# Patient Record
Sex: FEMALE | Race: OTHER
[De-identification: names, ages, dates, MRNs, and addresses within clinical notes are randomized per-mention and may not be internally consistent; named-entity substitution may affect disease eponyms.]

---

## 2017-01-03 PROBLEM — Z00.00 ENCOUNTER FOR PREVENTIVE HEALTH EXAMINATION: Status: ACTIVE | Noted: 2017-01-03

## 2017-01-19 ENCOUNTER — APPOINTMENT (OUTPATIENT)
Dept: SURGICAL ONCOLOGY | Facility: CLINIC | Age: 42
End: 2017-01-19

## 2017-01-19 VITALS
BODY MASS INDEX: 21.53 KG/M2 | HEART RATE: 102 BPM | WEIGHT: 120 LBS | DIASTOLIC BLOOD PRESSURE: 78 MMHG | SYSTOLIC BLOOD PRESSURE: 123 MMHG | HEIGHT: 62.5 IN | RESPIRATION RATE: 15 BRPM

## 2017-01-19 DIAGNOSIS — R92.2 INCONCLUSIVE MAMMOGRAM: ICD-10-CM

## 2017-01-19 DIAGNOSIS — N64.59 OTHER SIGNS AND SYMPTOMS IN BREAST: ICD-10-CM

## 2017-01-19 DIAGNOSIS — N64.4 MASTODYNIA: ICD-10-CM

## 2017-01-25 ENCOUNTER — TRANSCRIPTION ENCOUNTER (OUTPATIENT)
Age: 42
End: 2017-01-25

## 2017-01-25 PROBLEM — N64.59 ABNORMAL BREAST EXAM: Status: ACTIVE | Noted: 2017-01-25

## 2017-01-27 ENCOUNTER — TRANSCRIPTION ENCOUNTER (OUTPATIENT)
Age: 42
End: 2017-01-27

## 2017-04-19 ENCOUNTER — RESULT REVIEW (OUTPATIENT)
Age: 42
End: 2017-04-19

## 2017-04-20 ENCOUNTER — LABORATORY RESULT (OUTPATIENT)
Age: 42
End: 2017-04-20

## 2017-04-20 ENCOUNTER — APPOINTMENT (OUTPATIENT)
Dept: DERMATOLOGY | Facility: CLINIC | Age: 42
End: 2017-04-20

## 2017-04-20 VITALS — SYSTOLIC BLOOD PRESSURE: 90 MMHG | DIASTOLIC BLOOD PRESSURE: 60 MMHG

## 2017-04-20 DIAGNOSIS — D48.5 NEOPLASM OF UNCERTAIN BEHAVIOR OF SKIN: ICD-10-CM

## 2017-04-20 DIAGNOSIS — Z12.83 ENCOUNTER FOR SCREENING FOR MALIGNANT NEOPLASM OF SKIN: ICD-10-CM

## 2017-04-20 DIAGNOSIS — L81.4 OTHER MELANIN HYPERPIGMENTATION: ICD-10-CM

## 2017-08-17 ENCOUNTER — EMERGENCY (EMERGENCY)
Facility: HOSPITAL | Age: 42
LOS: 1 days | Discharge: HOME | End: 2017-08-17
Attending: EMERGENCY MEDICINE | Admitting: EMERGENCY MEDICINE
Payer: COMMERCIAL

## 2017-08-17 VITALS
HEART RATE: 80 BPM | RESPIRATION RATE: 14 BRPM | SYSTOLIC BLOOD PRESSURE: 110 MMHG | DIASTOLIC BLOOD PRESSURE: 70 MMHG | OXYGEN SATURATION: 99 %

## 2017-08-17 VITALS
WEIGHT: 121.03 LBS | HEIGHT: 62.5 IN | DIASTOLIC BLOOD PRESSURE: 74 MMHG | OXYGEN SATURATION: 99 % | HEART RATE: 92 BPM | SYSTOLIC BLOOD PRESSURE: 105 MMHG | RESPIRATION RATE: 15 BRPM | TEMPERATURE: 99 F

## 2017-08-17 PROCEDURE — 70450 CT HEAD/BRAIN W/O DYE: CPT | Mod: 26

## 2017-08-17 PROCEDURE — 99284 EMERGENCY DEPT VISIT MOD MDM: CPT | Mod: 25

## 2017-08-17 PROCEDURE — 70450 CT HEAD/BRAIN W/O DYE: CPT

## 2017-08-17 PROCEDURE — 99284 EMERGENCY DEPT VISIT MOD MDM: CPT

## 2017-08-17 RX ORDER — IBUPROFEN 200 MG
2 TABLET ORAL
Qty: 0 | Refills: 0 | COMMUNITY

## 2017-08-17 RX ORDER — ONDANSETRON 8 MG/1
4 TABLET, FILM COATED ORAL ONCE
Qty: 0 | Refills: 0 | Status: COMPLETED | OUTPATIENT
Start: 2017-08-17 | End: 2017-08-17

## 2017-08-17 RX ORDER — IBUPROFEN 200 MG
400 TABLET ORAL ONCE
Qty: 0 | Refills: 0 | Status: COMPLETED | OUTPATIENT
Start: 2017-08-17 | End: 2017-08-17

## 2017-08-17 RX ADMIN — Medication 400 MILLIGRAM(S): at 21:48

## 2017-08-17 RX ADMIN — Medication 400 MILLIGRAM(S): at 21:47

## 2017-08-17 RX ADMIN — ONDANSETRON 4 MILLIGRAM(S): 8 TABLET, FILM COATED ORAL at 21:47

## 2017-08-17 NOTE — ED PROVIDER NOTE - EYES, MLM
Clear bilaterally, pupils equal, round and reactive to light. Clear bilaterally, pupils equal, round and reactive to light. EOMI

## 2017-08-17 NOTE — ED PROVIDER NOTE - OBJECTIVE STATEMENT
40 y/o F pt presents to ED c/o headache and nausea s/p accidently hitting her head on a wall when walking through her house. Pt was seen at urgent care and sent to ED for CT scan. Motrin taken earlier with minimal relief, symptoms are persistent. No LOC, dizziness, blurry vision, vomiting. No further complaints at this time.   LMP: Now

## 2017-09-13 ENCOUNTER — RESULT REVIEW (OUTPATIENT)
Age: 42
End: 2017-09-13

## 2018-02-28 ENCOUNTER — APPOINTMENT (OUTPATIENT)
Dept: OTOLARYNGOLOGY | Facility: CLINIC | Age: 43
End: 2018-02-28

## 2018-10-08 ENCOUNTER — TRANSCRIPTION ENCOUNTER (OUTPATIENT)
Age: 43
End: 2018-10-08

## 2018-10-16 ENCOUNTER — TRANSCRIPTION ENCOUNTER (OUTPATIENT)
Age: 43
End: 2018-10-16

## 2019-01-12 ENCOUNTER — TRANSCRIPTION ENCOUNTER (OUTPATIENT)
Age: 44
End: 2019-01-12

## 2019-05-01 ENCOUNTER — TRANSCRIPTION ENCOUNTER (OUTPATIENT)
Age: 44
End: 2019-05-01

## 2019-05-21 ENCOUNTER — RESULT REVIEW (OUTPATIENT)
Age: 44
End: 2019-05-21

## 2019-06-24 ENCOUNTER — APPOINTMENT (OUTPATIENT)
Dept: GASTROENTEROLOGY | Facility: CLINIC | Age: 44
End: 2019-06-24

## 2019-08-02 ENCOUNTER — APPOINTMENT (OUTPATIENT)
Dept: GASTROENTEROLOGY | Facility: CLINIC | Age: 44
End: 2019-08-02

## 2019-08-02 ENCOUNTER — APPOINTMENT (OUTPATIENT)
Dept: GASTROENTEROLOGY | Facility: CLINIC | Age: 44
End: 2019-08-02
Payer: COMMERCIAL

## 2019-08-02 VITALS — BODY MASS INDEX: 22.79 KG/M2 | HEART RATE: 127 BPM | WEIGHT: 127 LBS | OXYGEN SATURATION: 100 % | HEIGHT: 62.5 IN

## 2019-08-02 DIAGNOSIS — K59.09 OTHER CONSTIPATION: ICD-10-CM

## 2019-08-02 PROCEDURE — 99214 OFFICE O/P EST MOD 30 MIN: CPT

## 2019-08-02 NOTE — PHYSICAL EXAM
[General Appearance - Alert] : alert [General Appearance - In No Acute Distress] : in no acute distress [Extraocular Movements] : extraocular movements were intact [Sclera] : the sclera and conjunctiva were normal [Outer Ear] : the ears and nose were normal in appearance [Hearing Threshold Finger Rub Not [place]] : hearing was normal [Neck Appearance] : the appearance of the neck was normal [Neck Cervical Mass (___cm)] : no neck mass was observed [Auscultation Breath Sounds / Voice Sounds] : lungs were clear to auscultation bilaterally [Heart Rate And Rhythm] : heart rate was normal and rhythm regular [Heart Sounds Gallop] : no gallops [Heart Sounds] : normal S1 and S2 [Murmurs] : no murmurs [Heart Sounds Pericardial Friction Rub] : no pericardial rub [Bowel Sounds] : normal bowel sounds [Abdomen Soft] : soft [Abdomen Tenderness] : non-tender [Abdomen Mass (___ Cm)] : no abdominal mass palpated [Cervical Lymph Nodes Enlarged Anterior Bilaterally] : anterior cervical [Cervical Lymph Nodes Enlarged Posterior Bilaterally] : posterior cervical [Abnormal Walk] : normal gait [Supraclavicular Lymph Nodes Enlarged Bilaterally] : supraclavicular [Nail Clubbing] : no clubbing  or cyanosis of the fingernails [Skin Color & Pigmentation] : normal skin color and pigmentation [] : no rash [Oriented To Time, Place, And Person] : oriented to person, place, and time [Impaired Insight] : insight and judgment were intact [Affect] : the affect was normal

## 2019-08-04 PROBLEM — K59.09 CHRONIC CONSTIPATION: Status: ACTIVE | Noted: 2019-08-04

## 2019-08-04 NOTE — HISTORY OF PRESENT ILLNESS
[de-identified] : 43-year-old woman without any major medical problems who presents with chief complaint of constipation.\par \par The patient reports for years she been constipated. She has a bowel movement every other day but does not feel completely empty.  At times she won't have a bowel movement for 4 days.  She  gets really constipated when she has to travel.  She takes MiraLAX only when she is really constipated - if she increasing a serving of MiraLAX, she may have diarrhea.\par \par Recently she started taking digestive enzymes which are making her feel better. She also takes probiotic.\par \par On 2 occasions, she's seen red blood in her stool but this is after a bowel movement.\par \par She denies that abnormal weight loss, nausea, vomiting. She gets occasional abdominal cramps just before bowel movements that then resolve.\par \par She seen a gastroenterologist about a year ago and there was a discussion about colonoscopy but did not have it. \par \par She says her maternal grandmother had possible ovarian cancer but also possible colon cancer she's not sure. She says her maternal cousin had GYN cancer or colon cancer in her 30s but she's not too sure.\par \par She follows regularly with her GYN and cancer Pap smears.\par \par \par All other review of systems are negative.  Denies cardiac symptoms.

## 2019-12-04 NOTE — ASSESSMENT
[FreeTextEntry1] : 44 y/o woman without major medical problems who is presenting with complaint of chronic constipation.  \par \par Since their is a possibility of colon cancer in the family, I have recommended a colonoscopy to rule out colon polyps, colorectal cancer etc. under monitored anesthesia care.  Risks such as perforation requiring surgery, bleeding, infection, colitis, missed lesion, internal organ injury, etc, risks of bowel prep including colitis, syncope etc. and risks of anesthesia including cardiopulmonary compromise were discussed with patient.  Patient verbalized understanding and agrees to proceed with the planned procedure.\par \par \par Metamucil once a day with increased fluid intake. 
Tj Boyd(PA)

## 2020-04-09 ENCOUNTER — TRANSCRIPTION ENCOUNTER (OUTPATIENT)
Age: 45
End: 2020-04-09

## 2020-06-15 ENCOUNTER — RESULT REVIEW (OUTPATIENT)
Age: 45
End: 2020-06-15

## 2020-06-25 ENCOUNTER — APPOINTMENT (OUTPATIENT)
Dept: UROLOGY | Facility: CLINIC | Age: 45
End: 2020-06-25
Payer: COMMERCIAL

## 2020-06-25 VITALS
OXYGEN SATURATION: 98 % | WEIGHT: 123 LBS | DIASTOLIC BLOOD PRESSURE: 81 MMHG | HEIGHT: 62.5 IN | RESPIRATION RATE: 16 BRPM | TEMPERATURE: 98.2 F | HEART RATE: 92 BPM | SYSTOLIC BLOOD PRESSURE: 122 MMHG | BODY MASS INDEX: 22.07 KG/M2

## 2020-06-25 DIAGNOSIS — Z84.1 FAMILY HISTORY OF DISORDERS OF KIDNEY AND URETER: ICD-10-CM

## 2020-06-25 DIAGNOSIS — Z83.3 FAMILY HISTORY OF DIABETES MELLITUS: ICD-10-CM

## 2020-06-25 DIAGNOSIS — N39.0 URINARY TRACT INFECTION, SITE NOT SPECIFIED: ICD-10-CM

## 2020-06-25 PROCEDURE — 99204 OFFICE O/P NEW MOD 45 MIN: CPT

## 2020-06-25 NOTE — HISTORY OF PRESENT ILLNESS
[FreeTextEntry1] : 44 year old F with cc of microscopic hematuria. Pt was seen by GYN for routine exam and found to have blood in the urine. UA showed 6 RBC. Review of past UAs consistently shows blood but most are during UTI. She has never been told this in the past. Denies gross hematuria. No hx of stones. Father with hx of several stones. Had issues with UTIs prior to her pregnancies. No tobacco hx. No exposure hx. No family hx of  malignancy. MGM with ovarian ca. \par \par At baseline, has nocturia but drinks water and tea before bed as she is not drinking much during the day.

## 2020-06-25 NOTE — PHYSICAL EXAM
[General Appearance - Well Developed] : well developed [General Appearance - Well Nourished] : well nourished [General Appearance - In No Acute Distress] : no acute distress [Exaggerated Use Of Accessory Muscles For Inspiration] : no accessory muscle use [Edema] : no peripheral edema [Abdomen Soft] : soft [Abdomen Tenderness] : non-tender [Costovertebral Angle Tenderness] : no ~M costovertebral angle tenderness [Urinary Bladder Findings] : the bladder was normal on palpation [Normal Station and Gait] : the gait and station were normal for the patient's age [Skin Color & Pigmentation] : normal skin color and pigmentation [Cervical Lymph Nodes Enlarged Anterior Bilaterally] : anterior cervical [No Focal Deficits] : no focal deficits [Oriented To Time, Place, And Person] : oriented to person, place, and time [Supraclavicular Lymph Nodes Enlarged Bilaterally] : supraclavicular

## 2020-06-25 NOTE — REVIEW OF SYSTEMS
[Constipation] : constipation [Told you have blood in urine on a urine test] : told blood was present in a urine test [Negative] : Heme/Lymph

## 2020-08-07 ENCOUNTER — OUTPATIENT (OUTPATIENT)
Dept: OUTPATIENT SERVICES | Facility: HOSPITAL | Age: 45
LOS: 1 days | End: 2020-08-07
Payer: COMMERCIAL

## 2020-08-07 ENCOUNTER — APPOINTMENT (OUTPATIENT)
Dept: UROLOGY | Facility: CLINIC | Age: 45
End: 2020-08-07
Payer: COMMERCIAL

## 2020-08-07 VITALS — SYSTOLIC BLOOD PRESSURE: 122 MMHG | HEART RATE: 75 BPM | RESPIRATION RATE: 17 BRPM | DIASTOLIC BLOOD PRESSURE: 80 MMHG

## 2020-08-07 VITALS — TEMPERATURE: 98.1 F

## 2020-08-07 DIAGNOSIS — R35.0 FREQUENCY OF MICTURITION: ICD-10-CM

## 2020-08-07 DIAGNOSIS — R31.29 OTHER MICROSCOPIC HEMATURIA: ICD-10-CM

## 2020-08-07 PROCEDURE — 76775 US EXAM ABDO BACK WALL LIM: CPT | Mod: 26

## 2020-08-07 PROCEDURE — 52000 CYSTOURETHROSCOPY: CPT

## 2020-08-07 PROCEDURE — 76775 US EXAM ABDO BACK WALL LIM: CPT

## 2020-08-10 PROBLEM — R31.29 MICROSCOPIC HEMATURIA: Status: ACTIVE | Noted: 2020-06-25

## 2020-08-12 DIAGNOSIS — R31.29 OTHER MICROSCOPIC HEMATURIA: ICD-10-CM

## 2020-08-24 ENCOUNTER — TRANSCRIPTION ENCOUNTER (OUTPATIENT)
Age: 45
End: 2020-08-24

## 2020-08-25 DIAGNOSIS — R39.9 UNSPECIFIED SYMPTOMS AND SIGNS INVOLVING THE GENITOURINARY SYSTEM: ICD-10-CM

## 2020-09-10 ENCOUNTER — OUTPATIENT (OUTPATIENT)
Dept: OUTPATIENT SERVICES | Facility: HOSPITAL | Age: 45
LOS: 1 days | End: 2020-09-10
Payer: COMMERCIAL

## 2020-09-10 ENCOUNTER — APPOINTMENT (OUTPATIENT)
Dept: CT IMAGING | Facility: CLINIC | Age: 45
End: 2020-09-10
Payer: COMMERCIAL

## 2020-09-10 DIAGNOSIS — R39.9 UNSPECIFIED SYMPTOMS AND SIGNS INVOLVING THE GENITOURINARY SYSTEM: ICD-10-CM

## 2020-09-10 DIAGNOSIS — Z00.8 ENCOUNTER FOR OTHER GENERAL EXAMINATION: ICD-10-CM

## 2020-09-10 DIAGNOSIS — R31.29 OTHER MICROSCOPIC HEMATURIA: ICD-10-CM

## 2020-09-10 PROCEDURE — 74178 CT ABD&PLV WO CNTR FLWD CNTR: CPT

## 2020-09-10 PROCEDURE — 74178 CT ABD&PLV WO CNTR FLWD CNTR: CPT | Mod: 26

## 2020-11-25 ENCOUNTER — APPOINTMENT (OUTPATIENT)
Dept: MRI IMAGING | Facility: CLINIC | Age: 45
End: 2020-11-25
Payer: COMMERCIAL

## 2020-11-25 ENCOUNTER — OUTPATIENT (OUTPATIENT)
Dept: OUTPATIENT SERVICES | Facility: HOSPITAL | Age: 45
LOS: 1 days | End: 2020-11-25
Payer: COMMERCIAL

## 2020-11-25 DIAGNOSIS — N28.9 DISORDER OF KIDNEY AND URETER, UNSPECIFIED: ICD-10-CM

## 2020-11-25 PROCEDURE — 74183 MRI ABD W/O CNTR FLWD CNTR: CPT

## 2020-11-25 PROCEDURE — 74183 MRI ABD W/O CNTR FLWD CNTR: CPT | Mod: 26

## 2020-11-25 PROCEDURE — A9585: CPT

## 2021-01-04 ENCOUNTER — APPOINTMENT (OUTPATIENT)
Dept: UROLOGY | Facility: CLINIC | Age: 46
End: 2021-01-04
Payer: COMMERCIAL

## 2021-01-04 PROCEDURE — 99447 NTRPROF PH1/NTRNET/EHR 11-20: CPT

## 2021-01-12 ENCOUNTER — APPOINTMENT (OUTPATIENT)
Dept: UROLOGY | Facility: CLINIC | Age: 46
End: 2021-01-12

## 2021-02-16 ENCOUNTER — APPOINTMENT (OUTPATIENT)
Dept: UROLOGY | Facility: CLINIC | Age: 46
End: 2021-02-16
Payer: COMMERCIAL

## 2021-02-16 ENCOUNTER — APPOINTMENT (OUTPATIENT)
Dept: UROLOGY | Facility: CLINIC | Age: 46
End: 2021-02-16

## 2021-02-16 VITALS
WEIGHT: 122 LBS | DIASTOLIC BLOOD PRESSURE: 74 MMHG | SYSTOLIC BLOOD PRESSURE: 106 MMHG | HEIGHT: 62 IN | RESPIRATION RATE: 17 BRPM | TEMPERATURE: 98.3 F | HEART RATE: 90 BPM | BODY MASS INDEX: 22.45 KG/M2

## 2021-02-16 DIAGNOSIS — D49.512 NEOPLASM OF UNSPECIFIED BEHAVIOR OF LEFT KIDNEY: ICD-10-CM

## 2021-02-16 DIAGNOSIS — N28.9 DISORDER OF KIDNEY AND URETER, UNSPECIFIED: ICD-10-CM

## 2021-02-16 PROCEDURE — 99215 OFFICE O/P EST HI 40 MIN: CPT

## 2021-02-16 PROCEDURE — 99072 ADDL SUPL MATRL&STAF TM PHE: CPT

## 2021-02-16 RX ORDER — SODIUM SULFATE, POTASSIUM SULFATE, MAGNESIUM SULFATE 17.5; 3.13; 1.6 G/ML; G/ML; G/ML
17.5-3.13-1.6 SOLUTION, CONCENTRATE ORAL
Qty: 1 | Refills: 0 | Status: COMPLETED | COMMUNITY
Start: 2019-08-04 | End: 2021-02-16

## 2021-02-21 PROBLEM — N28.9 RENAL LESION: Noted: 2020-09-16

## 2021-02-21 PROBLEM — D49.512 NEOPLASM OF UNSPECIFIED BEHAVIOR OF LEFT KIDNEY: Status: ACTIVE | Noted: 2021-02-21

## 2021-02-21 NOTE — HISTORY OF PRESENT ILLNESS
[FreeTextEntry1] : She initially presented with microscopic hematuria June 2020.\par Underwent cystoscopy August 2020: No significant bladder findings.  Office renal ultrasound done same day revealed an isoechoic vascular lesion in the mid/lower pole of the left kidney measuring 1.8 cm.  Also incidentally, 9 mm hypoechoic nonvascular lesion seen in the upper pole of the left kidney.\par \par CT A/P, September 2020: 1.9 cm heterogeneously enhancing lesion in the left midpole kidney.  Unclear whether this represented a renal neoplasm.\par \par MRI abdomen without IV contrast 11/25/2020: 1.5 cm heterogeneous enhancing lesion in the midpole of the left kidney.  Findings suspicious for possible renal neoplasm.

## 2021-02-21 NOTE — ASSESSMENT
How Severe Is It?: moderate [FreeTextEntry1] : Prior records including imaging reports reviewed. CT and MRI abdomen imaging reviewed with patient and . 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 Lesion is completely endophytic. We discussed the potential of renal cell carcinoma versus benign renal neoplasm.\par \par We discussed the options including active surveillance, percutaneous thermal ablation, partial nephrectomy. Pro/con of each approach reviewed. Questions answered.\par \par Recommend to proceed with minimally invasive laparoscopic partial nephrectomy.  Reviewed operative procedure, hospital stay and recovery time.  Risks of surgery discussed including risks of bleeding (both immediate and delayed), urine leak, wound/abdominal infection, adjacent organ injury (bowel/vascular), conversion to open surgery, risk of renal loss, DVT/PE, and anesthetic complications.  Discussed the likelihood of both malignant and benign pathology.  \par \par All questions answered.  Patient agrees to proceed as recommended but would like to schedule procedure sometime in April. I recommended an interim renal ultrasound to confirm no significant growth. Will call with results. Is This A New Presentation, Or A Follow-Up?: Follow Up Isotretinoin Additional History: Patient reports 100% compliance with both forms of birth control. Dryness is tolerable. 3 new small pimples this past month.

## 2021-02-21 NOTE — LETTER BODY
[FreeTextEntry1] : I had the pleasure of seeing your patient, AIME SANCHEZ, in the office today.  \par \par Please see my office note below.\par \par Thank you for allowing me to participate in his care and please do not hesitate to contact me with any questions or concerns regarding her care.\par \par Sincerely,\par \par Tray Barboza MD\par Chief of Urology, [facility]\par  of Urology\par [address] [place] [address]\[place] [address]\par PH:      [phone]\par Email:  [email or web address]

## 2021-02-24 ENCOUNTER — NON-APPOINTMENT (OUTPATIENT)
Age: 46
End: 2021-02-24

## 2021-03-22 ENCOUNTER — OUTPATIENT (OUTPATIENT)
Dept: OUTPATIENT SERVICES | Facility: HOSPITAL | Age: 46
LOS: 1 days | End: 2021-03-22
Payer: COMMERCIAL

## 2021-03-22 DIAGNOSIS — Z20.828 CONTACT WITH AND (SUSPECTED) EXPOSURE TO OTHER VIRAL COMMUNICABLE DISEASES: ICD-10-CM

## 2021-03-22 LAB — SARS-COV-2 RNA SPEC QL NAA+PROBE: SIGNIFICANT CHANGE UP

## 2021-03-22 PROCEDURE — U0003: CPT

## 2021-03-22 PROCEDURE — C9803: CPT

## 2021-03-22 PROCEDURE — U0005: CPT

## 2021-03-23 DIAGNOSIS — Z20.828 CONTACT WITH AND (SUSPECTED) EXPOSURE TO OTHER VIRAL COMMUNICABLE DISEASES: ICD-10-CM

## 2021-05-26 ENCOUNTER — RESULT REVIEW (OUTPATIENT)
Age: 46
End: 2021-05-26

## 2021-09-10 ENCOUNTER — TRANSCRIPTION ENCOUNTER (OUTPATIENT)
Age: 46
End: 2021-09-10

## 2022-06-15 ENCOUNTER — NON-APPOINTMENT (OUTPATIENT)
Age: 47
End: 2022-06-15

## 2022-06-17 ENCOUNTER — APPOINTMENT (OUTPATIENT)
Dept: ORTHOPEDIC SURGERY | Facility: CLINIC | Age: 47
End: 2022-06-17
Payer: COMMERCIAL

## 2022-06-17 VITALS — BODY MASS INDEX: 22.45 KG/M2 | WEIGHT: 122 LBS | HEIGHT: 62 IN

## 2022-06-17 DIAGNOSIS — S60.551A SUPERFICIAL FOREIGN BODY OF RIGHT HAND, INITIAL ENCOUNTER: ICD-10-CM

## 2022-06-17 PROCEDURE — 99203 OFFICE O/P NEW LOW 30 MIN: CPT | Mod: 57

## 2022-06-20 PROBLEM — S60.551A FOREIGN BODY OF RIGHT HAND, INITIAL ENCOUNTER: Status: ACTIVE | Noted: 2022-06-20

## 2022-06-20 NOTE — HISTORY OF PRESENT ILLNESS
[4] : 4 [3] : 3 [Dull/Aching] : dull/aching [Constant] : constant [Ice] : ice [de-identified] : R MF foreign body under nail the other day\par On Abx for infection [] : no [FreeTextEntry1] : R hand [FreeTextEntry3] : 6/14/22 [FreeTextEntry5] : jammed finger. has tingling  [FreeTextEntry9] : abx [de-identified] : activity [de-identified] : 6[facility]14/22 [facility] [de-identified] : XR

## 2022-06-20 NOTE — ASSESSMENT
[FreeTextEntry1] : I rec removal FB R MF\par R/B/A of surgery discussed with the patient. Risks including but not limited to infection, nerve damage, tendon damage, pain, stiffness, recurrence, no resolution of symptoms, loss of function, limb or life. They understand and agree to surgery \par Return post op

## 2022-07-05 ENCOUNTER — NON-APPOINTMENT (OUTPATIENT)
Age: 47
End: 2022-07-05

## 2022-07-28 ENCOUNTER — RESULT REVIEW (OUTPATIENT)
Age: 47
End: 2022-07-28

## 2022-09-28 NOTE — ASSESSMENT
To room with mother c/o cough and nasal congestion that stated 3 days ago. Happy and playful. Sleeping well. Eating and drinking well. Needs note for . [FreeTextEntry1] : Discussed eval with CTU and cysto. Pt wants to avoid radiation and has no risk factors and so reasonable for US instead. \par --KARLI\par --Cysto

## 2023-07-07 ENCOUNTER — NON-APPOINTMENT (OUTPATIENT)
Age: 48
End: 2023-07-07

## 2023-10-08 ENCOUNTER — EMERGENCY (EMERGENCY)
Facility: HOSPITAL | Age: 48
LOS: 0 days | Discharge: LEFT BEFORE DISCHARGE | End: 2023-10-08
Payer: COMMERCIAL

## 2023-10-08 VITALS
RESPIRATION RATE: 18 BRPM | HEART RATE: 60 BPM | OXYGEN SATURATION: 100 % | SYSTOLIC BLOOD PRESSURE: 101 MMHG | DIASTOLIC BLOOD PRESSURE: 76 MMHG | TEMPERATURE: 98 F

## 2023-10-08 DIAGNOSIS — R11.0 NAUSEA: ICD-10-CM

## 2023-10-08 DIAGNOSIS — R10.9 UNSPECIFIED ABDOMINAL PAIN: ICD-10-CM

## 2023-10-08 DIAGNOSIS — Z53.21 PROCEDURE AND TREATMENT NOT CARRIED OUT DUE TO PATIENT LEAVING PRIOR TO BEING SEEN BY HEALTH CARE PROVIDER: ICD-10-CM

## 2023-10-08 PROCEDURE — L9991: CPT

## 2023-10-08 NOTE — ED ADULT TRIAGE NOTE - CHIEF COMPLAINT QUOTE
Pt. is A&OX3, BIBEMS with c/o nausea. Pt reports waking up this morning with mild nausea. The nausea has gotten worse throughout the day. Complains of abdominal pain that radiates to the sides.

## 2023-10-14 ENCOUNTER — NON-APPOINTMENT (OUTPATIENT)
Age: 48
End: 2023-10-14

## 2024-04-30 NOTE — ED ADULT NURSE NOTE - FALLEN IN THE PAST
Felix Eli is here today for Follow-up    Medications: medications verified, no change  Refills needed today? No     Tobacco history: verified     Advanced Directives: No not on file, not interested.    BP >140/90? No    Care Teams Updated? Yes    Patient Preference for result Communication via: "[employer]    Cell Phone:   Telephone Information:   Mobile [phone]     Okay to leave a message containing results? Yes       Health Maintenance       Pneumococcal Vaccine 0-64 (1 of 2 - PCV)  Never done    Hepatitis B Vaccine (1 of 3 - 19+ 3-dose series)  Never done    Shingles Vaccine (1 of 2)  Never done    COVID-19 Vaccine (4 - 2023-24 season)  Overdue since 9/1/2023           Following review of the above:  Patient is not proceeding with:     Note: Refer to final orders and clinician documentation.          Immunization History   Administered Date(s) Administered    COVID Pfizer 12Y+ (Requires Dilution) 03/25/2021, 05/11/2021, 11/12/2021    Tdap 06/27/2008, 01/31/2019         PHQ 2:  PHQ 2 Score Adult PHQ 2 Score Adult PHQ 2 Interpretation Little interest or pleasure in activity?   4/30/2024   7:06 AM 0 No further screening needed 0       PHQ 9:          no

## 2024-07-19 ENCOUNTER — NON-APPOINTMENT (OUTPATIENT)
Age: 49
End: 2024-07-19

## 2025-01-02 ENCOUNTER — NON-APPOINTMENT (OUTPATIENT)
Age: 50
End: 2025-01-02